# Patient Record
Sex: MALE | Race: WHITE | NOT HISPANIC OR LATINO | Employment: FULL TIME | URBAN - METROPOLITAN AREA
[De-identification: names, ages, dates, MRNs, and addresses within clinical notes are randomized per-mention and may not be internally consistent; named-entity substitution may affect disease eponyms.]

---

## 2017-03-01 ENCOUNTER — HOSPITAL ENCOUNTER (OUTPATIENT)
Dept: FAMILY MEDICINE CLINIC | Facility: CLINIC | Age: 52
Setting detail: SPECIMEN
Discharge: HOME | End: 2017-03-01
Attending: NURSE PRACTITIONER | Admitting: NURSE PRACTITIONER

## 2017-03-01 ENCOUNTER — HOSPITAL ENCOUNTER (OUTPATIENT)
Dept: LAB | Facility: HOSPITAL | Age: 52
Discharge: HOME | End: 2017-03-01
Attending: NURSE PRACTITIONER | Admitting: NURSE PRACTITIONER

## 2017-03-01 LAB
BASOPHILS # BLD AUTO: 0.1 10*3/UL (ref 0–0.2)
BASOPHILS NFR BLD AUTO: 1 % (ref 0–2)
DIFFERENTIAL METHOD BLD: (no result)
EOSINOPHIL # BLD AUTO: 0.2 10*3/UL (ref 0–0.3)
EOSINOPHIL # BLD AUTO: 4 % (ref 0–3)
ERYTHROCYTE [DISTWIDTH] IN BLOOD BY AUTOMATED COUNT: 12.2 % (ref 11.5–14.5)
HCT VFR BLD AUTO: 45.1 % (ref 40–54)
HGB BLD-MCNC: 15.3 G/DL (ref 14–18)
LYMPHOCYTES # BLD AUTO: 1.7 10*3/UL (ref 0.8–4.8)
LYMPHOCYTES NFR BLD AUTO: 30 % (ref 18–42)
MCH RBC QN AUTO: 29.6 PG (ref 26–32)
MCHC RBC AUTO-ENTMCNC: 34 G/DL (ref 32–36)
MCV RBC AUTO: 87 FL (ref 80–94)
MONOCYTES # BLD AUTO: 0.5 10*3/UL (ref 0.1–1.3)
MONOCYTES NFR BLD AUTO: 10 % (ref 2–11)
NEUTROPHILS # BLD AUTO: 3 10*3/UL (ref 2.3–8.6)
NEUTROPHILS NFR BLD AUTO: 55 % (ref 50–75)
NRBC BLD AUTO-RTO: 0 /100{WBCS}
NRBC/RBC NFR BLD MANUAL: 0 10*3/UL
PLATELET # BLD AUTO: 262 10*3/UL (ref 150–450)
PMV BLD AUTO: 9.1 FL (ref 7.4–10.4)
RBC # BLD AUTO: 5.18 10*6/UL (ref 4.6–6)
VIT B12 SERPL-MCNC: 387 PG/ML (ref 180–914)
WBC # BLD AUTO: 5.5 10*3/UL (ref 4.5–11.5)

## 2017-09-14 ENCOUNTER — HOSPITAL ENCOUNTER (OUTPATIENT)
Dept: LAB | Facility: HOSPITAL | Age: 52
Discharge: HOME | End: 2017-09-14
Attending: FAMILY MEDICINE | Admitting: FAMILY MEDICINE

## 2018-08-29 ENCOUNTER — HOSPITAL ENCOUNTER (OUTPATIENT)
Dept: FAMILY MEDICINE CLINIC | Facility: CLINIC | Age: 53
Setting detail: SPECIMEN
Discharge: HOME | End: 2018-08-29
Attending: FAMILY MEDICINE | Admitting: FAMILY MEDICINE

## 2018-09-09 ENCOUNTER — HOSPITAL ENCOUNTER (OUTPATIENT)
Dept: URGENT CARE | Facility: CLINIC | Age: 53
Discharge: HOME | End: 2018-09-09
Attending: FAMILY MEDICINE | Admitting: FAMILY MEDICINE

## 2018-10-16 ENCOUNTER — HOSPITAL ENCOUNTER (OUTPATIENT)
Dept: FAMILY MEDICINE CLINIC | Facility: CLINIC | Age: 53
Setting detail: SPECIMEN
Discharge: HOME | End: 2018-10-16
Attending: NURSE PRACTITIONER | Admitting: NURSE PRACTITIONER

## 2018-10-17 ENCOUNTER — HOSPITAL ENCOUNTER (OUTPATIENT)
Dept: LAB | Facility: HOSPITAL | Age: 53
Discharge: HOME | End: 2018-10-17
Attending: NURSE PRACTITIONER | Admitting: NURSE PRACTITIONER

## 2018-10-17 LAB
ANION GAP SERPL CALC-SCNC: 15.3 MMOL/L (ref 10–20)
BUN SERPL-MCNC: 10 MG/DL (ref 8–20)
BUN/CREAT SERPL: 11.1 (ref 6.2–20.3)
CALCIUM SERPL-MCNC: 9.2 MG/DL (ref 8.9–10.3)
CHLORIDE SERPL-SCNC: 102 MMOL/L (ref 101–111)
CONV CO2: 27 MMOL/L (ref 22–32)
CREAT UR-MCNC: 0.9 MG/DL (ref 0.7–1.2)
GLUCOSE SERPL-MCNC: 105 MG/DL (ref 65–99)
POTASSIUM SERPL-SCNC: 4.3 MMOL/L (ref 3.6–5.1)
SODIUM SERPL-SCNC: 140 MMOL/L (ref 136–144)

## 2019-10-09 ENCOUNTER — OFFICE VISIT (OUTPATIENT)
Dept: FAMILY MEDICINE CLINIC | Facility: CLINIC | Age: 54
End: 2019-10-09

## 2019-10-09 VITALS
DIASTOLIC BLOOD PRESSURE: 87 MMHG | TEMPERATURE: 98 F | BODY MASS INDEX: 32.75 KG/M2 | SYSTOLIC BLOOD PRESSURE: 129 MMHG | OXYGEN SATURATION: 96 % | RESPIRATION RATE: 20 BRPM | HEIGHT: 72 IN | WEIGHT: 241.8 LBS | HEART RATE: 84 BPM

## 2019-10-09 DIAGNOSIS — M10.9 GOUT, UNSPECIFIED CAUSE, UNSPECIFIED CHRONICITY, UNSPECIFIED SITE: ICD-10-CM

## 2019-10-09 DIAGNOSIS — E78.5 HYPERLIPIDEMIA, UNSPECIFIED HYPERLIPIDEMIA TYPE: Primary | ICD-10-CM

## 2019-10-09 PROBLEM — E03.9 HYPOTHYROIDISM: Status: ACTIVE | Noted: 2018-10-17

## 2019-10-09 PROBLEM — E03.9 HYPOTHYROIDISM: Status: RESOLVED | Noted: 2018-10-17 | Resolved: 2019-10-09

## 2019-10-09 PROCEDURE — 99213 OFFICE O/P EST LOW 20 MIN: CPT | Performed by: FAMILY MEDICINE

## 2019-10-09 RX ORDER — ALLOPURINOL 300 MG/1
300 TABLET ORAL DAILY
COMMUNITY
End: 2019-10-09 | Stop reason: SDUPTHER

## 2019-10-09 RX ORDER — SIMVASTATIN 20 MG
1 TABLET ORAL EVERY 24 HOURS
COMMUNITY
Start: 2017-07-11 | End: 2019-10-09 | Stop reason: SDUPTHER

## 2019-10-09 RX ORDER — COLCHICINE 0.6 MG/1
1 CAPSULE ORAL DAILY
COMMUNITY
Start: 2019-04-17

## 2019-10-09 RX ORDER — ALLOPURINOL 300 MG/1
300 TABLET ORAL DAILY
Qty: 90 TABLET | Refills: 3 | Status: SHIPPED | OUTPATIENT
Start: 2019-10-09 | End: 2020-12-21

## 2019-10-09 RX ORDER — SIMVASTATIN 20 MG
20 TABLET ORAL EVERY 24 HOURS
Qty: 90 TABLET | Refills: 3 | Status: SHIPPED | OUTPATIENT
Start: 2019-10-09 | End: 2020-10-23

## 2019-10-09 NOTE — PROGRESS NOTES
"Subjective   Bharath Mejia is a 53 y.o. male.     Chief Complaint   Patient presents with   • Hyperlipidemia   • Gout       HPI  Chief complaint: Hyperlipidemia, gout    Patient is a 53-year-old white male comes in for follow-up and maintenance of his current problems which includes    1.  Hyperlipidemia-stable patient on Zocor 20 mg daily.  He denies myalgias arthralgias.  No nausea or anorexia.      2. gout-stable-patient on enalapril 3 mg daily and colchicine as needed.  He denies recent episode of gouty arthritis or tophi        The following portions of the patient's history were reviewed and updated as appropriate: allergies, current medications, past family history, past medical history, past social history, past surgical history and problem list.    Review of Systems    Objective     /87 (BP Location: Left arm, Patient Position: Sitting, Cuff Size: Large Adult)   Pulse 84   Temp 98 °F (36.7 °C) (Oral)   Resp 20   Ht 182.9 cm (72\")   Wt 110 kg (241 lb 12.8 oz)   SpO2 96%   BMI 32.79 kg/m²     Physical Exam   Constitutional: He is oriented to person, place, and time. He appears well-developed and well-nourished.   HENT:   Head: Normocephalic and atraumatic.   Eyes: Conjunctivae and EOM are normal. Pupils are equal, round, and reactive to light.   Neck: Normal range of motion. Neck supple.   Cardiovascular: Normal rate, regular rhythm, normal heart sounds and intact distal pulses.   Pulmonary/Chest: Effort normal and breath sounds normal.   Abdominal: Soft. Bowel sounds are normal.   Musculoskeletal: Normal range of motion.   Neurological: He is alert and oriented to person, place, and time.   Skin: Skin is warm and dry.   Psychiatric: He has a normal mood and affect. His behavior is normal.   Nursing note and vitals reviewed.        Assessment/Plan   Bharath was seen today for hyperlipidemia and gout.    Diagnoses and all orders for this visit:    Hyperlipidemia, unspecified hyperlipidemia " type  -     Lipid Panel; Future  -     ALT; Future    Gout, unspecified cause, unspecified chronicity, unspecified site      Patient Instructions   Continue your current medications and treatment.    Have the follow up labs done and call for results.    Follow up in the office in 1 year.      Faustino Timmons Jr., MD    10/09/19

## 2019-10-09 NOTE — PATIENT INSTRUCTIONS
Continue your current medications and treatment.    Have the follow up labs done and call for results.    Follow up in the office in 1 year.

## 2020-10-23 ENCOUNTER — OFFICE VISIT (OUTPATIENT)
Dept: FAMILY MEDICINE CLINIC | Facility: CLINIC | Age: 55
End: 2020-10-23

## 2020-10-23 ENCOUNTER — TELEPHONE (OUTPATIENT)
Dept: FAMILY MEDICINE CLINIC | Facility: CLINIC | Age: 55
End: 2020-10-23

## 2020-10-23 VITALS
BODY MASS INDEX: 31.56 KG/M2 | DIASTOLIC BLOOD PRESSURE: 90 MMHG | HEART RATE: 79 BPM | WEIGHT: 233 LBS | OXYGEN SATURATION: 97 % | HEIGHT: 72 IN | SYSTOLIC BLOOD PRESSURE: 137 MMHG | RESPIRATION RATE: 20 BRPM | TEMPERATURE: 97.6 F

## 2020-10-23 DIAGNOSIS — M10.9 GOUT, UNSPECIFIED CAUSE, UNSPECIFIED CHRONICITY, UNSPECIFIED SITE: ICD-10-CM

## 2020-10-23 DIAGNOSIS — E78.5 HYPERLIPIDEMIA, UNSPECIFIED HYPERLIPIDEMIA TYPE: Primary | ICD-10-CM

## 2020-10-23 PROCEDURE — 99396 PREV VISIT EST AGE 40-64: CPT | Performed by: FAMILY MEDICINE

## 2020-10-23 RX ORDER — ROSUVASTATIN CALCIUM 10 MG/1
10 TABLET, COATED ORAL DAILY
Qty: 90 TABLET | Refills: 3 | Status: SHIPPED | OUTPATIENT
Start: 2020-10-23 | End: 2021-12-06

## 2020-10-23 NOTE — PROGRESS NOTES
"Subjective   Bharath Mejia is a 54 y.o. male.     Chief Complaint   Patient presents with   • Annual Exam     Insurance physical       HPI chief complaint: Wellness exam    The patient is a 54-year-old white male comes in for wellness exam.    Patient has a history of hyperlipidemia and gout.  The patient is currently on Zocor.  He is having some myalgias and arthralgias.  He wonders whether this could be from his Zocor.  He requests a change in the Zocor to a different medication.    Patient states that he is not had any episodes of gout over the past several months.        The following portions of the patient's history were reviewed and updated as appropriate: allergies, current medications, past family history, past medical history, past social history, past surgical history and problem list.    Review of Systems   Constitutional: Negative for chills and fever.   HENT: Negative for congestion, sinus pressure and swollen glands.    Eyes: Negative for blurred vision and pain.   Respiratory: Negative for cough and shortness of breath.    Cardiovascular: Negative for chest pain and leg swelling.   Gastrointestinal: Negative for abdominal pain, nausea and indigestion.   Endocrine: Negative for cold intolerance, heat intolerance, polydipsia, polyphagia and polyuria.   Musculoskeletal: Positive for arthralgias and myalgias.   Skin: Negative for dry skin, rash and bruise.   Neurological: Negative for dizziness, syncope and headache.   Psychiatric/Behavioral: Negative for dysphoric mood and stress.       Objective     /90 (BP Location: Left arm, Patient Position: Sitting, Cuff Size: Large Adult)   Pulse 79   Temp 97.6 °F (36.4 °C) (Infrared)   Resp 20   Ht 182.9 cm (72\")   Wt 106 kg (233 lb)   SpO2 97%   BMI 31.60 kg/m²     Physical Exam  Vitals signs and nursing note reviewed.   Constitutional:       Appearance: He is well-developed and normal weight.   HENT:      Head: Normocephalic and atraumatic.      " Nose: Nose normal.      Mouth/Throat:      Mouth: Mucous membranes are moist.      Pharynx: Oropharynx is clear.   Eyes:      Extraocular Movements: Extraocular movements intact.      Conjunctiva/sclera: Conjunctivae normal.      Pupils: Pupils are equal, round, and reactive to light.   Neck:      Musculoskeletal: Normal range of motion and neck supple.   Cardiovascular:      Rate and Rhythm: Normal rate and regular rhythm.      Pulses: Normal pulses.      Heart sounds: Normal heart sounds.   Pulmonary:      Effort: Pulmonary effort is normal.      Breath sounds: Normal breath sounds.   Abdominal:      General: Abdomen is flat. Bowel sounds are normal.      Palpations: Abdomen is soft.   Musculoskeletal: Normal range of motion.   Skin:     General: Skin is warm and dry.   Neurological:      Mental Status: He is alert and oriented to person, place, and time.   Psychiatric:         Behavior: Behavior normal.         Thought Content: Thought content normal.         Judgment: Judgment normal.           Assessment/Plan   Diagnoses and all orders for this visit:    1. Hyperlipidemia, unspecified hyperlipidemia type (Primary)  -     Lipid Panel; Future    2. Gout, unspecified cause, unspecified chronicity, unspecified site  -     CBC & Differential; Future  -     Comprehensive Metabolic Panel; Future  -     Uric Acid; Future    Other orders  -     rosuvastatin (Crestor) 10 MG tablet; Take 1 tablet by mouth Daily.  Dispense: 90 tablet; Refill: 3      Patient Instructions   Continue your current mediations treatment, except take crestor instead the Zocor.    Have the follow up labs done and call for results.    Follow up in the office in 1 year.              Faustino Timmons Jr., MD    10/23/20

## 2020-10-23 NOTE — PATIENT INSTRUCTIONS
Continue your current mediations treatment, except take crestor instead the Zocor.    Have the follow up labs done and call for results.    Follow up in the office in 1 year.

## 2020-10-26 ENCOUNTER — LAB (OUTPATIENT)
Dept: LAB | Facility: HOSPITAL | Age: 55
End: 2020-10-26

## 2020-10-26 DIAGNOSIS — E78.5 HYPERLIPIDEMIA, UNSPECIFIED HYPERLIPIDEMIA TYPE: ICD-10-CM

## 2020-10-26 DIAGNOSIS — M10.9 GOUT, UNSPECIFIED CAUSE, UNSPECIFIED CHRONICITY, UNSPECIFIED SITE: ICD-10-CM

## 2020-10-26 LAB
ALBUMIN SERPL-MCNC: 4.5 G/DL (ref 3.5–5.2)
ALBUMIN/GLOB SERPL: 2.3 G/DL
ALP SERPL-CCNC: 60 U/L (ref 39–117)
ALT SERPL W P-5'-P-CCNC: 32 U/L (ref 1–41)
ANION GAP SERPL CALCULATED.3IONS-SCNC: 7.4 MMOL/L (ref 5–15)
AST SERPL-CCNC: 26 U/L (ref 1–40)
BASOPHILS # BLD AUTO: 0.06 10*3/MM3 (ref 0–0.2)
BASOPHILS NFR BLD AUTO: 1.1 % (ref 0–1.5)
BILIRUB SERPL-MCNC: 0.3 MG/DL (ref 0–1.2)
BUN SERPL-MCNC: 15 MG/DL (ref 6–20)
BUN/CREAT SERPL: 16 (ref 7–25)
CALCIUM SPEC-SCNC: 9 MG/DL (ref 8.6–10.5)
CHLORIDE SERPL-SCNC: 104 MMOL/L (ref 98–107)
CHOLEST SERPL-MCNC: 154 MG/DL (ref 0–200)
CK SERPL-CCNC: 83 U/L (ref 20–200)
CO2 SERPL-SCNC: 29.6 MMOL/L (ref 22–29)
CREAT SERPL-MCNC: 0.94 MG/DL (ref 0.76–1.27)
DEPRECATED RDW RBC AUTO: 39.5 FL (ref 37–54)
EOSINOPHIL # BLD AUTO: 0.19 10*3/MM3 (ref 0–0.4)
EOSINOPHIL NFR BLD AUTO: 3.5 % (ref 0.3–6.2)
ERYTHROCYTE [DISTWIDTH] IN BLOOD BY AUTOMATED COUNT: 12.2 % (ref 12.3–15.4)
GFR SERPL CREATININE-BSD FRML MDRD: 84 ML/MIN/1.73
GLOBULIN UR ELPH-MCNC: 2 GM/DL
GLUCOSE SERPL-MCNC: 102 MG/DL (ref 65–99)
HCT VFR BLD AUTO: 43.6 % (ref 37.5–51)
HDLC SERPL-MCNC: 35 MG/DL (ref 40–60)
HGB BLD-MCNC: 14.7 G/DL (ref 13–17.7)
IMM GRANULOCYTES # BLD AUTO: 0.03 10*3/MM3 (ref 0–0.05)
IMM GRANULOCYTES NFR BLD AUTO: 0.6 % (ref 0–0.5)
LDLC SERPL CALC-MCNC: 100 MG/DL (ref 0–100)
LDLC/HDLC SERPL: 2.8 {RATIO}
LYMPHOCYTES # BLD AUTO: 1.65 10*3/MM3 (ref 0.7–3.1)
LYMPHOCYTES NFR BLD AUTO: 30.5 % (ref 19.6–45.3)
MCH RBC QN AUTO: 30 PG (ref 26.6–33)
MCHC RBC AUTO-ENTMCNC: 33.7 G/DL (ref 31.5–35.7)
MCV RBC AUTO: 89 FL (ref 79–97)
MONOCYTES # BLD AUTO: 0.54 10*3/MM3 (ref 0.1–0.9)
MONOCYTES NFR BLD AUTO: 10 % (ref 5–12)
NEUTROPHILS NFR BLD AUTO: 2.94 10*3/MM3 (ref 1.7–7)
NEUTROPHILS NFR BLD AUTO: 54.3 % (ref 42.7–76)
NRBC BLD AUTO-RTO: 0 /100 WBC (ref 0–0.2)
PLATELET # BLD AUTO: 201 10*3/MM3 (ref 140–450)
PMV BLD AUTO: 10.9 FL (ref 6–12)
POTASSIUM SERPL-SCNC: 4.4 MMOL/L (ref 3.5–5.2)
PROT SERPL-MCNC: 6.5 G/DL (ref 6–8.5)
RBC # BLD AUTO: 4.9 10*6/MM3 (ref 4.14–5.8)
SODIUM SERPL-SCNC: 141 MMOL/L (ref 136–145)
TRIGL SERPL-MCNC: 105 MG/DL (ref 0–150)
URATE SERPL-MCNC: 5 MG/DL (ref 3.4–7)
VLDLC SERPL-MCNC: 19 MG/DL (ref 5–40)
WBC # BLD AUTO: 5.41 10*3/MM3 (ref 3.4–10.8)

## 2020-10-26 PROCEDURE — 85025 COMPLETE CBC W/AUTO DIFF WBC: CPT

## 2020-10-26 PROCEDURE — 36415 COLL VENOUS BLD VENIPUNCTURE: CPT

## 2020-10-26 PROCEDURE — 84550 ASSAY OF BLOOD/URIC ACID: CPT

## 2020-10-26 PROCEDURE — 80061 LIPID PANEL: CPT

## 2020-10-26 PROCEDURE — 80053 COMPREHEN METABOLIC PANEL: CPT

## 2020-10-26 PROCEDURE — 82550 ASSAY OF CK (CPK): CPT

## 2020-12-21 RX ORDER — ALLOPURINOL 300 MG/1
TABLET ORAL
Qty: 90 TABLET | Refills: 0 | Status: SHIPPED | OUTPATIENT
Start: 2020-12-21 | End: 2021-04-19

## 2021-04-19 RX ORDER — ALLOPURINOL 300 MG/1
TABLET ORAL
Qty: 90 TABLET | Refills: 1 | Status: SHIPPED | OUTPATIENT
Start: 2021-04-19 | End: 2021-12-06

## 2021-11-01 ENCOUNTER — OFFICE VISIT (OUTPATIENT)
Dept: FAMILY MEDICINE CLINIC | Facility: CLINIC | Age: 56
End: 2021-11-01

## 2021-11-01 VITALS
HEIGHT: 71 IN | TEMPERATURE: 97 F | OXYGEN SATURATION: 98 % | SYSTOLIC BLOOD PRESSURE: 146 MMHG | RESPIRATION RATE: 16 BRPM | BODY MASS INDEX: 34.3 KG/M2 | HEART RATE: 89 BPM | WEIGHT: 245 LBS | DIASTOLIC BLOOD PRESSURE: 85 MMHG

## 2021-11-01 DIAGNOSIS — M10.9 GOUT, UNSPECIFIED CAUSE, UNSPECIFIED CHRONICITY, UNSPECIFIED SITE: ICD-10-CM

## 2021-11-01 DIAGNOSIS — E78.5 HYPERLIPIDEMIA, UNSPECIFIED HYPERLIPIDEMIA TYPE: ICD-10-CM

## 2021-11-01 DIAGNOSIS — Z00.00 WELLNESS EXAMINATION: Primary | ICD-10-CM

## 2021-11-01 PROCEDURE — 99396 PREV VISIT EST AGE 40-64: CPT | Performed by: FAMILY MEDICINE

## 2021-11-01 NOTE — PROGRESS NOTES
"Subjective   Bharath Mejia is a 55 y.o. male.     Chief Complaint   Patient presents with   • Annual Exam     Insurance physical       HPI  Chief complaint: Wellness exam.      Patient is a 55-year-old male comes in for large exam.    Patient has a history of stable hyperlipidemia and gout.    Patient is .  Is monogamous.  Does not drink alcohol smoke cigarettes or use illicit drugs.  He does not have signs or symptoms of depression.        The following portions of the patient's history were reviewed and updated as appropriate: allergies, current medications, past family history, past medical history, past social history, past surgical history and problem list.    Review of Systems    Objective     /85 (BP Location: Left arm, Patient Position: Sitting, Cuff Size: Large Adult)   Pulse 89   Temp 97 °F (36.1 °C) (Infrared)   Resp 16   Ht 180.3 cm (71\")   Wt 111 kg (245 lb)   SpO2 98%   BMI 34.17 kg/m²     Physical Exam  Vitals and nursing note reviewed.   Constitutional:       Appearance: He is well-developed and normal weight.   HENT:      Head: Normocephalic and atraumatic.      Nose: Nose normal.      Mouth/Throat:      Mouth: Mucous membranes are moist.      Pharynx: Oropharynx is clear.   Eyes:      Conjunctiva/sclera: Conjunctivae normal.      Pupils: Pupils are equal, round, and reactive to light.   Cardiovascular:      Rate and Rhythm: Normal rate and regular rhythm.      Heart sounds: Normal heart sounds.   Pulmonary:      Effort: Pulmonary effort is normal.      Breath sounds: Normal breath sounds.   Abdominal:      General: Abdomen is flat. Bowel sounds are normal.      Palpations: Abdomen is soft.   Musculoskeletal:         General: Normal range of motion.      Cervical back: Neck supple.   Skin:     General: Skin is warm and dry.   Neurological:      Mental Status: He is alert and oriented to person, place, and time.   Psychiatric:         Behavior: Behavior normal.         Thought " Content: Thought content normal.         Judgment: Judgment normal.           Assessment/Plan   Diagnoses and all orders for this visit:    1. Wellness examination (Primary)    2. Hyperlipidemia, unspecified hyperlipidemia type  -     Lipid Panel; Future  -     ALT; Future    3. Gout, unspecified cause, unspecified chronicity, unspecified site  -     Uric Acid; Future  -     Basic Metabolic Panel; Future      Patient Instructions   Continue your current medications and treatment.    Have the follow up labs done and call for results.    Follow up in the office in 1 year.      Faustino Timmons Jr., MD    11/01/21

## 2021-12-06 RX ORDER — ROSUVASTATIN CALCIUM 10 MG/1
TABLET, COATED ORAL
Qty: 90 TABLET | Refills: 0 | Status: SHIPPED | OUTPATIENT
Start: 2021-12-06 | End: 2022-04-18

## 2021-12-06 RX ORDER — ALLOPURINOL 300 MG/1
TABLET ORAL
Qty: 90 TABLET | Refills: 0 | Status: SHIPPED | OUTPATIENT
Start: 2021-12-06 | End: 2022-04-18

## 2022-01-25 ENCOUNTER — TELEPHONE (OUTPATIENT)
Dept: FAMILY MEDICINE CLINIC | Facility: CLINIC | Age: 57
End: 2022-01-25

## 2022-01-25 DIAGNOSIS — E78.5 HYPERLIPIDEMIA, UNSPECIFIED HYPERLIPIDEMIA TYPE: ICD-10-CM

## 2022-01-25 DIAGNOSIS — M10.9 GOUT, UNSPECIFIED CAUSE, UNSPECIFIED CHRONICITY, UNSPECIFIED SITE: ICD-10-CM

## 2022-01-25 DIAGNOSIS — M25.572 ACUTE LEFT ANKLE PAIN: Primary | ICD-10-CM

## 2022-01-25 NOTE — TELEPHONE ENCOUNTER
I Spoke with the patient.  He has ankle pain that is worse at rest than when he is up walking around.  I recommended laboratory testing including a D-dimer, CBC, CMP, lipid profile.

## 2022-01-25 NOTE — TELEPHONE ENCOUNTER
Caller: MONICA JESUS    Relationship: Emergency Contact    Best call back number: [phone]    What orders are you requesting (i.e. lab or imaging): VASCULAR ULTRASOUND TO CHECK FOR BLOOD CLOTS, SPECIFICALLY IN THE LEGS/ANKLES.    In what timeframe would the patient need to come in: ASAP    Where will you receive your lab/imaging services: WHEREVER RECOMMENDED

## 2022-01-26 LAB
ALBUMIN SERPL-MCNC: 4.9 G/DL (ref 3.8–4.9)
ALBUMIN/GLOB SERPL: 2.3 {RATIO} (ref 1.2–2.2)
ALP SERPL-CCNC: 70 IU/L (ref 44–121)
ALT SERPL-CCNC: 62 IU/L (ref 0–44)
AMBIG ABBREV CMP14 DEFAULT: NORMAL
AMBIG ABBREV LP DEFAULT: NORMAL
AST SERPL-CCNC: 45 IU/L (ref 0–40)
BASOPHILS # BLD AUTO: 0.1 X10E3/UL (ref 0–0.2)
BASOPHILS NFR BLD AUTO: 1 %
BILIRUB SERPL-MCNC: 0.5 MG/DL (ref 0–1.2)
BUN SERPL-MCNC: 14 MG/DL (ref 6–24)
BUN/CREAT SERPL: 15 (ref 9–20)
CALCIUM SERPL-MCNC: 9.3 MG/DL (ref 8.7–10.2)
CHLORIDE SERPL-SCNC: 101 MMOL/L (ref 96–106)
CHOLEST SERPL-MCNC: 211 MG/DL (ref 100–199)
CO2 SERPL-SCNC: 25 MMOL/L (ref 20–29)
CREAT SERPL-MCNC: 0.91 MG/DL (ref 0.76–1.27)
D DIMER PPP FEU-MCNC: 0.31 MG/L FEU (ref 0–0.49)
EOSINOPHIL # BLD AUTO: 0.2 X10E3/UL (ref 0–0.4)
EOSINOPHIL NFR BLD AUTO: 3 %
ERYTHROCYTE [DISTWIDTH] IN BLOOD BY AUTOMATED COUNT: 12.4 % (ref 11.6–15.4)
GLOBULIN SER CALC-MCNC: 2.1 G/DL (ref 1.5–4.5)
GLUCOSE SERPL-MCNC: 140 MG/DL (ref 65–99)
HCT VFR BLD AUTO: 45.8 % (ref 37.5–51)
HDLC SERPL-MCNC: 36 MG/DL
HGB BLD-MCNC: 15.3 G/DL (ref 13–17.7)
IMM GRANULOCYTES # BLD AUTO: 0 X10E3/UL (ref 0–0.1)
IMM GRANULOCYTES NFR BLD AUTO: 1 %
LDLC SERPL CALC-MCNC: 140 MG/DL (ref 0–99)
LYMPHOCYTES # BLD AUTO: 1.7 X10E3/UL (ref 0.7–3.1)
LYMPHOCYTES NFR BLD AUTO: 27 %
MCH RBC QN AUTO: 30.7 PG (ref 26.6–33)
MCHC RBC AUTO-ENTMCNC: 33.4 G/DL (ref 31.5–35.7)
MCV RBC AUTO: 92 FL (ref 79–97)
MONOCYTES # BLD AUTO: 0.5 X10E3/UL (ref 0.1–0.9)
MONOCYTES NFR BLD AUTO: 8 %
NEUTROPHILS # BLD AUTO: 3.8 X10E3/UL (ref 1.4–7)
NEUTROPHILS NFR BLD AUTO: 60 %
PLATELET # BLD AUTO: 230 X10E3/UL (ref 150–450)
POTASSIUM SERPL-SCNC: 4.4 MMOL/L (ref 3.5–5.2)
PROT SERPL-MCNC: 7 G/DL (ref 6–8.5)
RBC # BLD AUTO: 4.99 X10E6/UL (ref 4.14–5.8)
SODIUM SERPL-SCNC: 140 MMOL/L (ref 134–144)
TRIGL SERPL-MCNC: 192 MG/DL (ref 0–149)
URATE SERPL-MCNC: 5.1 MG/DL (ref 3.8–8.4)
VLDLC SERPL CALC-MCNC: 35 MG/DL (ref 5–40)
WBC # BLD AUTO: 6.2 X10E3/UL (ref 3.4–10.8)

## 2022-04-18 RX ORDER — ROSUVASTATIN CALCIUM 10 MG/1
TABLET, COATED ORAL
Qty: 90 TABLET | Refills: 0 | Status: SHIPPED | OUTPATIENT
Start: 2022-04-18 | End: 2022-08-09

## 2022-04-18 RX ORDER — ALLOPURINOL 300 MG/1
TABLET ORAL
Qty: 90 TABLET | Refills: 0 | Status: SHIPPED | OUTPATIENT
Start: 2022-04-18 | End: 2022-08-09

## 2022-08-09 RX ORDER — ALLOPURINOL 300 MG/1
TABLET ORAL
Qty: 90 TABLET | Refills: 0 | Status: SHIPPED | OUTPATIENT
Start: 2022-08-09 | End: 2022-12-04

## 2022-08-09 RX ORDER — ROSUVASTATIN CALCIUM 10 MG/1
TABLET, COATED ORAL
Qty: 90 TABLET | Refills: 0 | Status: SHIPPED | OUTPATIENT
Start: 2022-08-09 | End: 2022-12-04

## 2022-11-09 ENCOUNTER — OFFICE VISIT (OUTPATIENT)
Dept: FAMILY MEDICINE CLINIC | Facility: CLINIC | Age: 57
End: 2022-11-09

## 2022-11-09 VITALS
HEIGHT: 71 IN | RESPIRATION RATE: 18 BRPM | HEART RATE: 83 BPM | DIASTOLIC BLOOD PRESSURE: 86 MMHG | OXYGEN SATURATION: 96 % | BODY MASS INDEX: 34.16 KG/M2 | SYSTOLIC BLOOD PRESSURE: 134 MMHG | WEIGHT: 244 LBS | TEMPERATURE: 97.1 F

## 2022-11-09 DIAGNOSIS — E78.5 HYPERLIPIDEMIA, UNSPECIFIED HYPERLIPIDEMIA TYPE: ICD-10-CM

## 2022-11-09 DIAGNOSIS — Z00.00 WELLNESS EXAMINATION: Primary | ICD-10-CM

## 2022-11-09 DIAGNOSIS — Z13.9 ENCOUNTER FOR HEALTH-RELATED SCREENING: ICD-10-CM

## 2022-11-09 PROCEDURE — 99396 PREV VISIT EST AGE 40-64: CPT | Performed by: FAMILY MEDICINE

## 2022-11-09 NOTE — PROGRESS NOTES
"Subjective   Bharath Mejia is a 56 y.o. male.     Chief Complaint   Patient presents with   • Annual Exam     Yearly Physical       HPI  Complaint: Wellness exam    Patient is a 56-year-old white male who comes in for wellness exam.    Patient has history of stable hyperlipidemia and stable gallop.    Patient does not smoke cigarettes use illicit drugs or drink alcohol to excess.  He gets regular colonoscopies.  Patient sees an eye doctor and a dentist on a regular basis.  He wears a seatbelt when he rides in a vehicle.      The following portions of the patient's history were reviewed and updated as appropriate: allergies, current medications, past family history, past medical history, past social history, past surgical history and problem list.    Review of Systems    Objective     /86 (BP Location: Left arm, Patient Position: Sitting, Cuff Size: Large Adult)   Pulse 83   Temp 97.1 °F (36.2 °C) (Infrared)   Resp 18   Ht 180.3 cm (71\")   Wt 111 kg (244 lb)   SpO2 96%   BMI 34.03 kg/m²     Physical Exam  Vitals and nursing note reviewed.   Constitutional:       Appearance: He is well-developed.   HENT:      Head: Normocephalic and atraumatic.   Eyes:      Pupils: Pupils are equal, round, and reactive to light.   Cardiovascular:      Rate and Rhythm: Normal rate and regular rhythm.      Pulses: Normal pulses.      Heart sounds: Normal heart sounds. No murmur heard.    No friction rub. No gallop.   Pulmonary:      Effort: Pulmonary effort is normal.      Breath sounds: Normal breath sounds.   Abdominal:      General: Bowel sounds are normal.      Palpations: Abdomen is soft.   Musculoskeletal:         General: Normal range of motion.      Cervical back: Neck supple.   Skin:     General: Skin is warm and dry.   Neurological:      Mental Status: He is alert and oriented to person, place, and time.   Psychiatric:         Behavior: Behavior normal.         Thought Content: Thought content normal.         " Judgment: Judgment normal.           Assessment & Plan   Diagnoses and all orders for this visit:    1. Wellness examination (Primary)    2. Hyperlipidemia, unspecified hyperlipidemia type  -     ALT; Future  -     Lipid Panel; Future    3. Encounter for health-related screening  -     Hepatitis C Antibody; Future      Patient Instructions   Continue your current medications and treatment.    Follow up in the office in 1 year.    Have the follow up labs done and call for results.      Faustino Timmons Jr., MD    11/09/22

## 2022-11-09 NOTE — PATIENT INSTRUCTIONS
Continue your current medications and treatment.    Follow up in the office in 1 year.    Have the follow up labs done and call for results.

## 2022-12-04 RX ORDER — ALLOPURINOL 300 MG/1
TABLET ORAL
Qty: 90 TABLET | Refills: 0 | Status: SHIPPED | OUTPATIENT
Start: 2022-12-04 | End: 2023-03-16

## 2022-12-04 RX ORDER — ROSUVASTATIN CALCIUM 10 MG/1
TABLET, COATED ORAL
Qty: 90 TABLET | Refills: 0 | Status: SHIPPED | OUTPATIENT
Start: 2022-12-04 | End: 2023-03-16

## 2023-01-18 ENCOUNTER — HOSPITAL ENCOUNTER (EMERGENCY)
Facility: HOSPITAL | Age: 58
Discharge: HOME | End: 2023-01-18
Attending: EMERGENCY MEDICINE | Admitting: EMERGENCY MEDICINE
Payer: COMMERCIAL

## 2023-01-18 VITALS
DIASTOLIC BLOOD PRESSURE: 100 MMHG | BODY MASS INDEX: 33.44 KG/M2 | HEIGHT: 72 IN | RESPIRATION RATE: 16 BRPM | HEART RATE: 99 BPM | WEIGHT: 246.91 LBS | SYSTOLIC BLOOD PRESSURE: 153 MMHG | OXYGEN SATURATION: 98 % | TEMPERATURE: 97.5 F

## 2023-01-18 DIAGNOSIS — S91.011A: Primary | ICD-10-CM

## 2023-01-18 PROCEDURE — 25010000002 TETANUS-DIPHTH-ACELL PERTUSSIS 5-2.5-18.5 LF-MCG/0.5 SUSPENSION PREFILLED SYRINGE

## 2023-01-18 PROCEDURE — 0 LIDOCAINE 1 % SOLUTION

## 2023-01-18 PROCEDURE — 90715 TDAP VACCINE 7 YRS/> IM: CPT

## 2023-01-18 PROCEDURE — 90471 IMMUNIZATION ADMIN: CPT

## 2023-01-18 PROCEDURE — 99282 EMERGENCY DEPT VISIT SF MDM: CPT

## 2023-01-18 RX ORDER — LIDOCAINE HYDROCHLORIDE 10 MG/ML
10 INJECTION, SOLUTION INFILTRATION; PERINEURAL ONCE
Status: COMPLETED | OUTPATIENT
Start: 2023-01-18 | End: 2023-01-18

## 2023-01-18 RX ADMIN — TETANUS TOXOID, REDUCED DIPHTHERIA TOXOID AND ACELLULAR PERTUSSIS VACCINE, ADSORBED 0.5 ML: 5; 2.5; 8; 8; 2.5 SUSPENSION INTRAMUSCULAR at 20:39

## 2023-01-18 RX ADMIN — LIDOCAINE HYDROCHLORIDE 10 ML: 10 INJECTION, SOLUTION INFILTRATION; PERINEURAL at 20:51

## 2023-01-19 NOTE — ED PROVIDER NOTES
Subjective   History of Present Illness  Chief Complaint: Laceration    Context: Patient is a pleasant 57-year-old obese  male presenting to the emergency room today from home after injuring his right ankle.  Patient states that he was working on his truck, when a truck part fell onto his right leg, causing laceration to his right ankle.  Patient states that it happened around 1745 today.  Currently rates his pain at 1/10 and describes it as a burning.  Nothing seems make it worse or better.  Pain does not radiate.  Patient denies any use of blood thinners and reports no pertinent medical history.  He does not recall when his last tetanus vaccine was but thinks that it is due.  He has no known drug allergies.  He denies use of drugs, alcohol and tobacco.    Duration: 2 hours    Timing: Waxes and wanes    Severity: Mild        Review of Systems   Constitutional: Negative for chills.   HENT: Negative for congestion and rhinorrhea.    Respiratory: Negative for cough, chest tightness and shortness of breath.    Cardiovascular: Negative for chest pain and palpitations.   Gastrointestinal: Negative for abdominal pain, nausea and vomiting.   Genitourinary: Negative for dysuria and urgency.   Musculoskeletal: Negative for back pain.   Skin: Positive for wound.   Neurological: Negative for dizziness and weakness.   Psychiatric/Behavioral: Negative for confusion. The patient is not nervous/anxious.    All other systems reviewed and are negative.      Past Medical History:   Diagnosis Date   • Gout    • Hyperlipidemia    • Hypothyroidism        No Known Allergies    Past Surgical History:   Procedure Laterality Date   • ROTATOR CUFF REPAIR         Family History   Problem Relation Age of Onset   • Hyperlipidemia Mother    • Diabetes Mother    • Heart disease Mother    • Heart disease Father        Social History     Socioeconomic History   • Marital status:    Tobacco Use   • Smoking status: Never   • Smokeless  tobacco: Never   Vaping Use   • Vaping Use: Never used   Substance and Sexual Activity   • Alcohol use: No   • Sexual activity: Defer           Objective   Physical Exam  Vitals and nursing note reviewed.   Constitutional:       General: He is awake. He is not in acute distress.     Appearance: Normal appearance. He is well-developed. He is obese. He is not ill-appearing.   HENT:      Head: Normocephalic and atraumatic. No raccoon eyes or Sepulveda's sign.      Right Ear: Hearing, tympanic membrane and ear canal normal.      Left Ear: Hearing, tympanic membrane and ear canal normal.   Eyes:      General: Vision grossly intact. Gaze aligned appropriately.      Extraocular Movements: Extraocular movements intact.      Pupils: Pupils are equal, round, and reactive to light.   Cardiovascular:      Rate and Rhythm: Normal rate and regular rhythm.      Pulses: Normal pulses.      Heart sounds: Normal heart sounds. No murmur heard.  Pulmonary:      Effort: Pulmonary effort is normal. No respiratory distress.      Breath sounds: Normal breath sounds.   Abdominal:      General: Bowel sounds are normal.      Palpations: Abdomen is soft.      Tenderness: There is no abdominal tenderness.   Musculoskeletal:         General: Normal range of motion.      Cervical back: Normal range of motion and neck supple.        Legs:       Comments: No loss of range of motion or sensation on exam.  No evidence of muscular or tendon involvement on inspection.   Skin:     General: Skin is warm and dry.      Capillary Refill: Capillary refill takes less than 2 seconds.      Findings: Wound present.   Neurological:      General: No focal deficit present.      Mental Status: He is alert and oriented to person, place, and time. Mental status is at baseline.      GCS: GCS eye subscore is 4. GCS verbal subscore is 5. GCS motor subscore is 6.      Cranial Nerves: Cranial nerves 2-12 are intact.      Sensory: Sensation is intact.      Motor: Motor  function is intact.      Deep Tendon Reflexes: Reflexes are normal and symmetric.   Psychiatric:         Mood and Affect: Mood normal.         Behavior: Behavior normal.         Laceration Repair    Date/Time: 1/18/2023 9:03 PM  Performed by: Palak Hayes APRN  Authorized by: Lux Samuel MD     Consent:     Consent obtained:  Verbal    Consent given by:  Patient    Risks, benefits, and alternatives were discussed: yes      Risks discussed:  Infection, pain, poor cosmetic result, retained foreign body and need for additional repair    Alternatives discussed:  No treatment and referral  Universal protocol:     Procedure explained and questions answered to patient or proxy's satisfaction: yes      Site/side marked: yes      Immediately prior to procedure, a time out was called: yes      Patient identity confirmed:  Verbally with patient and arm band  Anesthesia:     Anesthesia method:  Local infiltration    Local anesthetic:  Lidocaine 1% w/o epi  Laceration details:     Location:  Leg    Leg location:  R lower leg    Length (cm):  2  Pre-procedure details:     Preparation:  Patient was prepped and draped in usual sterile fashion  Exploration:     Wound exploration: wound explored through full range of motion and entire depth of wound visualized    Treatment:     Area cleansed with:  Wilmer    Amount of cleaning:  Standard    Debridement:  None    Undermining:  None  Skin repair:     Repair method:  Sutures    Suture size:  3-0    Suture material:  Nylon    Suture technique:  Simple interrupted and running    Number of sutures:  9  Approximation:     Approximation:  Close  Repair type:     Repair type:  Simple  Post-procedure details:     Dressing:  Antibiotic ointment, non-adherent dressing and adhesive bandage    Procedure completion:  Tolerated well, no immediate complications               ED Course  ED Course as of 01/18/23 2108 Wed Jan 18, 2023   2016 Asked to take Wilder to set up laceration tray  "and clean patient's wound. [SJ]      ED Course User Index  [SJ] Palak Hayes, APRN      /100 (BP Location: Left arm, Patient Position: Sitting)   Pulse 99   Temp 97.5 °F (36.4 °C) (Oral)   Resp 15   Ht 182.9 cm (72\")   Wt 112 kg (246 lb 14.6 oz)   SpO2 98%   BMI 33.49 kg/m²   Labs Reviewed - No data to display  Medications   Tetanus-Diphth-Acell Pertussis (BOOSTRIX) injection 0.5 mL (0.5 mL Intramuscular Given 1/18/23 2039)   lidocaine (XYLOCAINE) 1 % injection 10 mL (10 mL Injection Given 1/18/23 2051)     No radiology results for the last day                                       Medical Decision Making  Patient was placed in a gown prior to assessment.  He is alert, nontoxic-appearing and hemodynamically stable on exam.  At triage, wound was wrapped and patient was placed back in the waiting room.  When he was placed in main ER area, I was able to assess him and remove dressing.  Upon initial assessment, it appears that full an object cause shearing injury.  At this time, I was unsure if sutures would be indicated.  Wound was cleaned and reassessed.  During that time, patient also received Tdap vaccination.  He was offered pain medication but declined.  When I reassessed patient, wound appeared to close with gentle manipulation.  I discussed the options of wound closure versus Surgicel with the patient.  I believe patient would benefit from sutures if wound will close properly.  Patient is agreeable to try to close wound.  Healing wounds treated with 1% lidocaine and T-shaped wound was closed with both running and simple interrupted sutures.  Bacitracin was applied and wound was dressed.  Patient was educated on wound care and signs of infection.  He verbalized understanding and is agreeable to plan of care.  Patient was able to ambulate upright steadily without assistance upon discharge.  No acute distress noted.    Comorbidi andties: Obesity, hyperlipidemia  Differentials: Abrasion, contusion, " laceration, skin tear not all inclusive of differentials considered.     Appropriate PPE worn during exam.    I discussed the findings with patient who voices understanding of discharge instructions, signs and symptoms requiring return to the ED; discharged improved and stable condition with follow-up for reevaluation.    Patient is aware that discharge does not mean that nothing is wrong but it indicates no emergency is present and they must continue care with follow-up as given below or physician of their choice.    This document is intended for medical expert use only.  Reading of this document by patients and/or patient's family without participating medical staff guidance may result in misinterpretation and unintended morbidity.  Any interpretation of such data is the responsibility of the patient and/or family member responsible for the patient in concert with their primary or specialist providers, not to be left for sources of online search as such as [employer], [employer] or similar queries.  Relying on these approaches to knowledge may result in misinterpretation, misguided goals of care and even death should patient or family members try recommendations outside of the realm of professional medical care in a supervised inpatient environment.    This medical document was created using Dragon dictation system. Some errors in speech recognition may occur.    Laceration of right medial ankle, initial encounter: acute illness or injury  Risk  Prescription drug management.          Final diagnoses:   Laceration of right medial ankle, initial encounter       ED Disposition  ED Disposition     ED Disposition   Discharge    Condition   Stable    Comment   --             Faustino Timmons Jr., MD  [address] [place] [address] [place] [address]  [phone]               Medication List      No changes were made to your prescriptions during this visit.          Palak Hayes, APRN  01/18/23 0608

## 2023-01-19 NOTE — ED NOTES
Pt presents to ED through triage with c/o laceration to inside of right ankle. Pt states he hit it on something while he was working on a truck. Bleeding controlled, wound cleaned by tech, provider at bedside for sutures.

## 2023-03-16 RX ORDER — ROSUVASTATIN CALCIUM 10 MG/1
TABLET, COATED ORAL
Qty: 90 TABLET | Refills: 0 | Status: SHIPPED | OUTPATIENT
Start: 2023-03-16

## 2023-03-16 RX ORDER — ALLOPURINOL 300 MG/1
TABLET ORAL
Qty: 90 TABLET | Refills: 0 | Status: SHIPPED | OUTPATIENT
Start: 2023-03-16

## 2023-09-05 ENCOUNTER — TELEPHONE (OUTPATIENT)
Dept: FAMILY MEDICINE CLINIC | Facility: CLINIC | Age: 58
End: 2023-09-05
Payer: COMMERCIAL

## 2023-09-05 DIAGNOSIS — E78.5 HYPERLIPIDEMIA, UNSPECIFIED HYPERLIPIDEMIA TYPE: Primary | ICD-10-CM

## 2023-09-05 DIAGNOSIS — Z13.9 ENCOUNTER FOR HEALTH-RELATED SCREENING: ICD-10-CM

## 2023-09-05 NOTE — TELEPHONE ENCOUNTER
Pt requesting labs from November to be reordered- was D/C due to not completing. Hep C Antibody, Lipid panel, ALT

## 2023-10-20 RX ORDER — ROSUVASTATIN CALCIUM 10 MG/1
TABLET, COATED ORAL
Qty: 90 TABLET | Refills: 0 | OUTPATIENT
Start: 2023-10-20

## 2023-10-24 RX ORDER — ROSUVASTATIN CALCIUM 10 MG/1
10 TABLET, COATED ORAL DAILY
Qty: 90 TABLET | Refills: 0 | Status: SHIPPED | OUTPATIENT
Start: 2023-10-24 | End: 2023-10-26

## 2023-10-24 NOTE — TELEPHONE ENCOUNTER
Caller: MONICA JESUS    Relationship: Emergency Contact    Best call back number: [phone]     Requested Prescriptions:   Requested Prescriptions     Pending Prescriptions Disp Refills    rosuvastatin (CRESTOR) 10 MG tablet 90 tablet 0     Sig: Take 1 tablet by mouth Daily.        Pharmacy where request should be sent: [facility] [address] [place] [address] [id number] - [phone]  - [phone]     Last office visit with prescribing clinician: Visit date not found   Last telemedicine visit with prescribing clinician: Visit date not found   Next office visit with prescribing clinician: 10/30/2023     Additional details provided by patient:     Does the patient have less than a 3 day supply:  [x] Yes  [] No    Would you like a call back once the refill request has been completed: [] Yes [] No    If the office needs to give you a call back, can they leave a voicemail: [] Yes [] No    Gokul Long   10/24/23 15:55 EDT

## 2023-10-26 ENCOUNTER — OFFICE VISIT (OUTPATIENT)
Dept: FAMILY MEDICINE CLINIC | Facility: CLINIC | Age: 58
End: 2023-10-26
Payer: COMMERCIAL

## 2023-10-26 VITALS
HEIGHT: 72 IN | TEMPERATURE: 98.4 F | SYSTOLIC BLOOD PRESSURE: 136 MMHG | DIASTOLIC BLOOD PRESSURE: 87 MMHG | RESPIRATION RATE: 16 BRPM | WEIGHT: 239 LBS | HEART RATE: 79 BPM | BODY MASS INDEX: 32.37 KG/M2 | OXYGEN SATURATION: 97 %

## 2023-10-26 DIAGNOSIS — G45.9 TIA (TRANSIENT ISCHEMIC ATTACK): Primary | ICD-10-CM

## 2023-10-26 DIAGNOSIS — M10.9 GOUT, UNSPECIFIED CAUSE, UNSPECIFIED CHRONICITY, UNSPECIFIED SITE: ICD-10-CM

## 2023-10-26 DIAGNOSIS — E78.5 HYPERLIPIDEMIA, UNSPECIFIED HYPERLIPIDEMIA TYPE: ICD-10-CM

## 2023-10-26 DIAGNOSIS — Z92.89 HISTORY OF RECENT HOSPITALIZATION: ICD-10-CM

## 2023-10-26 DIAGNOSIS — M25.674 DECREASED RANGE OF MOTION OF RIGHT FOOT: ICD-10-CM

## 2023-10-26 RX ORDER — ROSUVASTATIN CALCIUM 20 MG/1
20 TABLET, COATED ORAL DAILY
Qty: 90 TABLET | Refills: 0 | Status: SHIPPED | OUTPATIENT
Start: 2023-10-26 | End: 2024-01-24

## 2023-10-26 RX ORDER — ASPIRIN 81 MG/1
81 TABLET ORAL DAILY
COMMUNITY

## 2023-10-26 RX ORDER — ALLOPURINOL 300 MG/1
300 TABLET ORAL DAILY
Qty: 90 TABLET | Refills: 0 | Status: SHIPPED | OUTPATIENT
Start: 2023-10-26 | End: 2024-01-24

## 2023-10-26 NOTE — PROGRESS NOTES
"Subjective        Bharath Mejia is a 57 y.o. male.     Chief Complaint   Patient presents with    Transient Ischemic Attack     [facility]oso[facility] f/u-[facility]       History of Present Illness  Pt is new to me, established with this practice. He is here for FU after hospitalization at [facility] admitted 10/17/23, d/c'd on 10/19/23. He says he was admitted for \"TIA\", \"kaelyn stroke, on his brain stem\" (per his wife who is here with him). He says he is feeling fatigued, uncoordinated/imbalance, difficulty with speech \"words just don't sound like I'm saying them right\". He denies any effect to his vision, chewing or swallowing. He is supposed to set up appt with neurologist through [facility]. He is taking ASA 81 mg tab, 4 PO daily; \"they wanted to increase my statin drug to 40 mg, but there was no prescription sent in for it.\" Pt had a part fall on his rt foot at work which left a gash in LLE right above top of his foot. Occurred December 2022. Now his PT says he is having decreased ROM in the rt foot. Pt has noticed a \"bump\" on top of foot that was not there before his accident. He denies pain in rt foot or any effect on his mobility(walking).  Transient Ischemic Attack  This is a new problem. The current episode started 1 to 4 weeks ago. The problem has been rapidly improving. Associated symptoms include fatigue, headaches and weakness. Pertinent negatives include no arthralgias, change in bowel habit, chest pain, fever, numbness, urinary symptoms, vertigo, visual change or vomiting. The treatment provided significant relief.       The following portions of the patient's history were reviewed and updated as appropriate: allergies, current medications, past family history, past medical history, past social history, past surgical history and problem list.      Current Outpatient Medications:     allopurinol (ZYLOPRIM) 300 MG tablet, Take 1 tablet by mouth Daily for 90 days., Disp: 90 tablet, Rfl: 0    aspirin 81 " "MG EC tablet, Take 1 tablet by mouth Daily. Take 4 tabs by mouth daily.  Indications: Temporary Stroke, Disp: , Rfl:     colchicine 0.6 MG capsule capsule, Take 1 capsule by mouth Daily., Disp: , Rfl:     rosuvastatin (Crestor) 20 MG tablet, Take 1 tablet by mouth Daily for 90 days., Disp: 90 tablet, Rfl: 0    Recent Results (from the past 4032 hour(s))   PT AND PTT    Collection Time: 10/17/23  2:52 PM    Specimen: Blood   Result Value Ref Range    Protime 10.9 9.7 - 13.1 Second    INR 0.95     PTT 32.1 24.6 - 34.4 Second   AUTODIFF    Collection Time: 10/17/23  2:52 PM    Specimen: Blood   Result Value Ref Range    Neutrophil % 67.8 34.0 - 75.0 %    Lymphocyte % 20.0 17.0 - 53.0 %    Monocyte % 8.6 2.0 - 12.0 %    Eosinophil % 2.7 0.0 - 7.0 %    Basophil % 0.9 0.0 - 3.0 %    Neutrophils Absolute 3.7 1.5 - 7.1 x10(3)/ul    Lymphocytes Absolute 1.1 1.0 - 3.5 x10(3)/ul    Monocytes Absolute 0.5 0.0 - 1.0 x10(3)/ul    Eosinophils Absolute 0.1 0.0 - 0.7 x10(3)/ul    Basophils Absolute 0.0 0.0 - 0.3 x10(3)/ul       Objective     /87 (BP Location: Right arm, Patient Position: Sitting, Cuff Size: Large Adult)   Pulse 79   Temp 98.4 °F (36.9 °C) (Temporal)   Resp 16   Ht 182.9 cm (72\")   Wt 108 kg (239 lb)   SpO2 97%   BMI 32.41 kg/m²     Physical Exam    Result Review :                Assessment & Plan    Diagnoses and all orders for this visit:    1. TIA (transient ischemic attack) (Primary)  Comments:  New problem. Call this office if unable to contact [facility] for FU appt.    2. History of recent hospitalization    3. Hyperlipidemia, unspecified hyperlipidemia type  Comments:  Unstable. Increased Crestor to 20 mg PO daily. FU in 3 months.    4. Gout, unspecified cause, unspecified chronicity, unspecified site  Comments:  Stable. Continue allopurinol.    5. Decreased range of motion of right foot  -     XR Foot 3+ View Right; Future    Other orders  -     rosuvastatin (Crestor) 20 MG tablet; Take 1 " tablet by mouth Daily for 90 days.  Dispense: 90 tablet; Refill: 0  -     allopurinol (ZYLOPRIM) 300 MG tablet; Take 1 tablet by mouth Daily for 90 days.  Dispense: 90 tablet; Refill: 0       There are no Patient Instructions on file for this visit.    Follow Up   Return in about 3 months (around 1/26/2024) for Next scheduled follow up.    Patient was given instructions and counseling regarding his condition or for health maintenance advice. Please see specific information pulled into the AVS if appropriate.     Viri Cheng, APRN     10/27/23

## 2023-10-27 PROBLEM — Z92.89 HISTORY OF RECENT HOSPITALIZATION: Status: ACTIVE | Noted: 2023-10-27

## 2023-10-27 PROBLEM — M25.674 DECREASED RANGE OF MOTION OF RIGHT FOOT: Status: ACTIVE | Noted: 2023-10-27

## 2023-10-30 ENCOUNTER — HOSPITAL ENCOUNTER (OUTPATIENT)
Dept: GENERAL RADIOLOGY | Facility: HOSPITAL | Age: 58
Discharge: HOME | End: 2023-10-30
Admitting: REGISTERED NURSE
Payer: COMMERCIAL

## 2023-10-30 DIAGNOSIS — M25.674 DECREASED RANGE OF MOTION OF RIGHT FOOT: ICD-10-CM

## 2023-10-30 PROCEDURE — 73630 X-RAY EXAM OF FOOT: CPT

## 2024-01-26 RX ORDER — ALLOPURINOL 300 MG/1
300 TABLET ORAL DAILY
Qty: 90 TABLET | Refills: 0 | Status: SHIPPED | OUTPATIENT
Start: 2024-01-26 | End: 2024-04-25

## 2024-01-26 RX ORDER — ROSUVASTATIN CALCIUM 20 MG/1
20 TABLET, COATED ORAL DAILY
Qty: 90 TABLET | Refills: 0 | Status: SHIPPED | OUTPATIENT
Start: 2024-01-26 | End: 2024-04-25

## 2024-05-02 RX ORDER — ROSUVASTATIN CALCIUM 20 MG/1
20 TABLET, COATED ORAL DAILY
Qty: 90 TABLET | Refills: 0 | OUTPATIENT
Start: 2024-05-02